# Patient Record
Sex: FEMALE | Race: BLACK OR AFRICAN AMERICAN | NOT HISPANIC OR LATINO | Employment: UNEMPLOYED | URBAN - METROPOLITAN AREA
[De-identification: names, ages, dates, MRNs, and addresses within clinical notes are randomized per-mention and may not be internally consistent; named-entity substitution may affect disease eponyms.]

---

## 2021-01-27 ENCOUNTER — OFFICE VISIT (OUTPATIENT)
Dept: URGENT CARE | Facility: URGENT CARE | Age: 76
End: 2021-01-27
Payer: MEDICAID

## 2021-01-27 ENCOUNTER — ANCILLARY PROCEDURE (OUTPATIENT)
Dept: GENERAL RADIOLOGY | Facility: CLINIC | Age: 76
End: 2021-01-27
Attending: PHYSICIAN ASSISTANT
Payer: MEDICAID

## 2021-01-27 VITALS
OXYGEN SATURATION: 99 % | SYSTOLIC BLOOD PRESSURE: 108 MMHG | TEMPERATURE: 98.1 F | DIASTOLIC BLOOD PRESSURE: 67 MMHG | BODY MASS INDEX: 28.7 KG/M2 | HEART RATE: 85 BPM | RESPIRATION RATE: 20 BRPM | WEIGHT: 152 LBS

## 2021-01-27 DIAGNOSIS — E87.6 LOW BLOOD POTASSIUM: ICD-10-CM

## 2021-01-27 DIAGNOSIS — M25.512 ACUTE PAIN OF LEFT SHOULDER: ICD-10-CM

## 2021-01-27 DIAGNOSIS — I10 HYPERTENSION, UNSPECIFIED TYPE: Primary | ICD-10-CM

## 2021-01-27 LAB
ANION GAP SERPL CALCULATED.3IONS-SCNC: 6 MMOL/L (ref 3–14)
BASOPHILS # BLD AUTO: 0 10E9/L (ref 0–0.2)
BASOPHILS NFR BLD AUTO: 0.2 %
BUN SERPL-MCNC: 11 MG/DL (ref 7–30)
CALCIUM SERPL-MCNC: 9.2 MG/DL (ref 8.5–10.1)
CHLORIDE SERPL-SCNC: 104 MMOL/L (ref 94–109)
CO2 SERPL-SCNC: 28 MMOL/L (ref 20–32)
CREAT SERPL-MCNC: 0.68 MG/DL (ref 0.52–1.04)
DIFFERENTIAL METHOD BLD: ABNORMAL
EOSINOPHIL # BLD AUTO: 0.2 10E9/L (ref 0–0.7)
EOSINOPHIL NFR BLD AUTO: 3.8 %
ERYTHROCYTE [DISTWIDTH] IN BLOOD BY AUTOMATED COUNT: 13.1 % (ref 10–15)
GFR SERPL CREATININE-BSD FRML MDRD: 85 ML/MIN/{1.73_M2}
GLUCOSE SERPL-MCNC: 122 MG/DL (ref 70–99)
HCT VFR BLD AUTO: 34.9 % (ref 35–47)
HGB BLD-MCNC: 11.3 G/DL (ref 11.7–15.7)
LYMPHOCYTES # BLD AUTO: 1.5 10E9/L (ref 0.8–5.3)
LYMPHOCYTES NFR BLD AUTO: 36.4 %
MCH RBC QN AUTO: 30 PG (ref 26.5–33)
MCHC RBC AUTO-ENTMCNC: 32.4 G/DL (ref 31.5–36.5)
MCV RBC AUTO: 93 FL (ref 78–100)
MONOCYTES # BLD AUTO: 0.3 10E9/L (ref 0–1.3)
MONOCYTES NFR BLD AUTO: 7.9 %
NEUTROPHILS # BLD AUTO: 2.2 10E9/L (ref 1.6–8.3)
NEUTROPHILS NFR BLD AUTO: 51.7 %
PLATELET # BLD AUTO: 295 10E9/L (ref 150–450)
POTASSIUM SERPL-SCNC: 2.7 MMOL/L (ref 3.4–5.3)
RBC # BLD AUTO: 3.77 10E12/L (ref 3.8–5.2)
SODIUM SERPL-SCNC: 138 MMOL/L (ref 133–144)
WBC # BLD AUTO: 4.2 10E9/L (ref 4–11)

## 2021-01-27 PROCEDURE — 36415 COLL VENOUS BLD VENIPUNCTURE: CPT | Performed by: PHYSICIAN ASSISTANT

## 2021-01-27 PROCEDURE — 80048 BASIC METABOLIC PNL TOTAL CA: CPT | Performed by: PHYSICIAN ASSISTANT

## 2021-01-27 PROCEDURE — 93000 ELECTROCARDIOGRAM COMPLETE: CPT | Performed by: PHYSICIAN ASSISTANT

## 2021-01-27 PROCEDURE — 99204 OFFICE O/P NEW MOD 45 MIN: CPT | Performed by: PHYSICIAN ASSISTANT

## 2021-01-27 PROCEDURE — 85025 COMPLETE CBC W/AUTO DIFF WBC: CPT | Performed by: PHYSICIAN ASSISTANT

## 2021-01-27 PROCEDURE — 73030 X-RAY EXAM OF SHOULDER: CPT | Mod: LT | Performed by: RADIOLOGY

## 2021-01-27 RX ORDER — NAPROXEN 500 MG/1
500 TABLET ORAL 2 TIMES DAILY WITH MEALS
Qty: 30 TABLET | Refills: 0 | Status: SHIPPED | OUTPATIENT
Start: 2021-01-27 | End: 2021-02-04

## 2021-01-27 RX ORDER — POTASSIUM CHLORIDE 1500 MG/1
20 TABLET, EXTENDED RELEASE ORAL 2 TIMES DAILY
Qty: 30 TABLET | Refills: 0 | Status: SHIPPED | OUTPATIENT
Start: 2021-01-27 | End: 2021-02-04

## 2021-01-27 RX ORDER — AMLODIPINE BESYLATE 5 MG/1
5 TABLET ORAL DAILY
COMMUNITY
Start: 2020-12-30 | End: 2021-02-04

## 2021-01-27 NOTE — PROGRESS NOTES
Patient presents with:  Headache  Musculoskeletal Problem: Left upper back with Left shoulder pain-sx's all for about 4 months      Appointment made for patient to see Dr. Solorio 2/4/21    Greater than 45 minutes was used to review chart and labs, examine patient and discussing and reviewing the results of diagnostic tests, discussing risks and benefits of management (treatment) options, instruction for management and follow up and importance of compliance with treatment, as well as scheduling follow up to establish care.        Patient Instructions     (I10) Hypertension, unspecified type  (primary encounter diagnosis)  Comment:   Plan: Basic metabolic panel  (Ca, Cl, CO2, Creat,         Gluc, K, Na, BUN), CBC with platelets and         differential          Continue Amlodipine    (M25.512) Acute pain of left shoulder  Comment:   Plan: XR Shoulder Left 2 Views, EKG 12-lead complete         w/read - Clinics  Xray normal  Naprosyn as needed for pain  Follow up with primary clinic, you will likely benefit from a referral to Orthopedics and Physical therapy.              (E87.6) Low blood potassium  Comment:   Plan: EKG 12-lead complete w/read - Clinics,         potassium chloride ER (KLOR-CON M) 20 MEQ CR         tablet          See handout on potassium rich diet    Keep follow up appointment with primary clinic      Patient Education     Discharge Instructions: Eating a High-Potassium Diet  Your healthcare provider has told you to eat a high-potassium diet. This may be because you have low levels of potassium in your blood. Or it may be because you have high blood pressure. Potassium is found in many foods. These include dairy products, nuts, seeds, and beans. It s also found in many fruits and vegetables in high amounts.  Guidelines for a high-potassium diet    Eat fruits and vegetables in their fresh or raw form most often.    Check labels for ingredients that have potassium. This includes potassium chloride. Add these  items to your diet.    Try salt substitutes. Many of these have potassium.    Avoid licorice. This includes licorice root and teas that have licorice. These can reduce potassium levels in your body.  Eat plenty of the following high-potassium foods:    Fruits. Good choices are apricots (canned and fresh), bananas, cantaloupe, honeydew melon, kiwi, nectarines, oranges, orange juice, and pears. Dried fruits include apricots, dates, figs, and prunes. Prune juice also has potassium.    Vegetables. Good choices are asparagus, avocado, artichoke, broccoli, bamboo shoots, beets, [place] sprouts, cabbage, celery, chard, okra, potatoes (white and sweet), pumpkin, rutabaga, spinach (cooked), squash, and tomatoes. Tomato sauce, tomato juice, and vegetable juice cocktail are also good choices.    Chicken, fish, clams, and crab    Milk, chocolate milk, buttermilk, and soy milk    Legumes. These include black-eyed peas, chickpeas, lentils, lima beans, navy beans, red kidney beans, soybeans, and split peas.    Nuts and seeds. Try almonds, Brazil nuts, cashews, peanuts, peanut butter, pecans, pumpkin seeds, sunflower seeds, and walnuts.    Breads and cereals. These include bran and whole-grain products.    Others foods include chocolate, cocoa, coconut milk, and molasses  Follow-up  Make a follow-up appointment for a repeat test.  When to call your healthcare provider  Call your healthcare provider right away or go to the ER if you have any of the following:    Vomiting    Extreme tiredness (fatigue)    Diarrhea    Rapid, irregular heartbeat    Shortness of breath    Chest pain    Muscle cramps, spasms, or twitching    Weakness    Paralysis   Jona last reviewed this educational content on 11/1/2017 2000-2020 [employer]. [address] [place] [address], [place] [address]. All rights reserved. This information is not intended as a substitute for professional medical care. Always follow your healthcare professional's  instructions.                       SUBJECTIVE:   Alesha Alatorre is a 76 year old female who presents today with long standing left shoulder pain, at least for the past 4 months.    No falls or trauma.        Her Granddaughter LEVI is here with her today.    She lives with her daughter carmen and granddaughter Lilia. Levi checks on her daily.      Aleve muscle (orange lable) helps some, but it still hurts.    She does not have a primary clinic.        Still taking her blood pressure medication.  She started it in April after ED visit.      Denies any cough or chest pain, or fevers.      No past medical history on file.  Current Outpatient Medications   Medication Sig Dispense Refill     Multiple Vitamins-Iron (DAILY-CHRISSY/IRON/BETA-CAROTENE) TABS TAKE 1 TABLET BY MOUTH DAILY. (Patient not taking: Reported on 10/19/2020) 30 tablet 7     Social History     Tobacco Use     Smoking status: Never Smoker     Smokeless tobacco: Never Used   Substance Use Topics     Alcohol use: Not on file     Family History   Problem Relation Age of Onset     Diabetes Mother      Diabetes Father          ROS:    10 point ROS of systems including Constitutional, Eyes, Respiratory, Cardiovascular, Gastroenterology, Genitourinary, Integumentary, Muscularskeletal, Psychiatric ,neurological were all negative except for pertinent positives noted in my HPI         OBJECTIVE:  /67   Pulse 85   Temp 98.1  F (36.7  C)   Resp 20   Wt 68.9 kg (152 lb)   SpO2 99%   BMI 28.70 kg/m    Physical Exam:  GENERAL APPEARANCE: healthy, alert and no distress  EYES: EOMI,  PERRL, conjunctiva clear  HENT: ear canals and TM's normal.  Nose and mouth without ulcers, erythema or lesions  NECK: supple, nontender, no lymphadenopathy  RESP: lungs clear to auscultation - no rales, rhonchi or wheezes  CV: regular rates and rhythm, normal S1 S2, no murmur noted  ABDOMEN:  soft, nontender, no HSM or masses and bowel sounds normal  Extremities: left trapezius tender  to palpation.  ROM of left shoulder decreased secondary to pain.    Unable to raise left arm over shoulder at side, or directly overhead.    NEURO: Normal strength and tone, sensory exam grossly normal,  normal speech and mentation  SKIN: no suspicious lesions or rashes    X-Ray was done, my findings are: no fractures or bony abnormalities    EKG: no ST-T changes or q waves as per my interpretation, normal rhythm.

## 2021-01-27 NOTE — PATIENT INSTRUCTIONS
(I10) Hypertension, unspecified type  (primary encounter diagnosis)  Comment:   Plan: Basic metabolic panel  (Ca, Cl, CO2, Creat,         Gluc, K, Na, BUN), CBC with platelets and         differential          Continue Amlodipine    (M25.512) Acute pain of left shoulder  Comment:   Plan: XR Shoulder Left 2 Views, EKG 12-lead complete         w/read - Clinics  Xray normal  Naprosyn as needed for pain  Follow up with primary clinic, you will likely benefit from a referral to Orthopedics and Physical therapy.              (E87.6) Low blood potassium  Comment:   Plan: EKG 12-lead complete w/read - Clinics,         potassium chloride ER (KLOR-CON M) 20 MEQ CR         tablet          See handout on potassium rich diet    Keep follow up appointment with primary clinic      Patient Education     Discharge Instructions: Eating a High-Potassium Diet  Your healthcare provider has told you to eat a high-potassium diet. This may be because you have low levels of potassium in your blood. Or it may be because you have high blood pressure. Potassium is found in many foods. These include dairy products, nuts, seeds, and beans. It s also found in many fruits and vegetables in high amounts.  Guidelines for a high-potassium diet    Eat fruits and vegetables in their fresh or raw form most often.    Check labels for ingredients that have potassium. This includes potassium chloride. Add these items to your diet.    Try salt substitutes. Many of these have potassium.    Avoid licorice. This includes licorice root and teas that have licorice. These can reduce potassium levels in your body.  Eat plenty of the following high-potassium foods:    Fruits. Good choices are apricots (canned and fresh), bananas, cantaloupe, honeydew melon, kiwi, nectarines, oranges, orange juice, and pears. Dried fruits include apricots, dates, figs, and prunes. Prune juice also has potassium.    Vegetables. Good choices are asparagus, avocado, artichoke,  broccoli, bamboo shoots, beets, [place] sprouts, cabbage, celery, chard, okra, potatoes (white and sweet), pumpkin, rutabaga, spinach (cooked), squash, and tomatoes. Tomato sauce, tomato juice, and vegetable juice cocktail are also good choices.    Chicken, fish, clams, and crab    Milk, chocolate milk, buttermilk, and soy milk    Legumes. These include black-eyed peas, chickpeas, lentils, lima beans, navy beans, red kidney beans, soybeans, and split peas.    Nuts and seeds. Try almonds, [place] nuts, cashews, peanuts, peanut butter, pecans, pumpkin seeds, sunflower seeds, and walnuts.    Breads and cereals. These include bran and whole-grain products.    Others foods include chocolate, cocoa, coconut milk, and molasses  Follow-up  Make a follow-up appointment for a repeat test.  When to call your healthcare provider  Call your healthcare provider right away or go to the ER if you have any of the following:    Vomiting    Extreme tiredness (fatigue)    Diarrhea    Rapid, irregular heartbeat    Shortness of breath    Chest pain    Muscle cramps, spasms, or twitching    Weakness    Paralysis   Jona last reviewed this educational content on 11/1/2017 2000-2020 [employer]. [address] [place] [id number]. All rights reserved. This information is not intended as a substitute for professional medical care. Always follow your healthcare professional's instructions.

## 2021-02-04 ENCOUNTER — OFFICE VISIT (OUTPATIENT)
Dept: FAMILY MEDICINE | Facility: CLINIC | Age: 76
End: 2021-02-04
Payer: MEDICAID

## 2021-02-04 VITALS
DIASTOLIC BLOOD PRESSURE: 79 MMHG | TEMPERATURE: 98.1 F | HEART RATE: 83 BPM | OXYGEN SATURATION: 97 % | HEIGHT: 61 IN | SYSTOLIC BLOOD PRESSURE: 138 MMHG | BODY MASS INDEX: 28.75 KG/M2 | WEIGHT: 152.25 LBS

## 2021-02-04 DIAGNOSIS — M25.512 LEFT SHOULDER PAIN, UNSPECIFIED CHRONICITY: ICD-10-CM

## 2021-02-04 DIAGNOSIS — E87.6 HYPOKALEMIA: ICD-10-CM

## 2021-02-04 DIAGNOSIS — Z13.6 CARDIOVASCULAR SCREENING; LDL GOAL LESS THAN 130: ICD-10-CM

## 2021-02-04 DIAGNOSIS — Z78.0 ASYMPTOMATIC POSTMENOPAUSAL ESTROGEN DEFICIENCY: ICD-10-CM

## 2021-02-04 DIAGNOSIS — I10 ESSENTIAL HYPERTENSION WITH GOAL BLOOD PRESSURE LESS THAN 140/90: Primary | ICD-10-CM

## 2021-02-04 DIAGNOSIS — Z23 ENCOUNTER FOR IMMUNIZATION: ICD-10-CM

## 2021-02-04 DIAGNOSIS — Z12.11 SCREEN FOR COLON CANCER: ICD-10-CM

## 2021-02-04 LAB
ANION GAP SERPL CALCULATED.3IONS-SCNC: 5 MMOL/L (ref 3–14)
BUN SERPL-MCNC: 17 MG/DL (ref 7–30)
CALCIUM SERPL-MCNC: 9.1 MG/DL (ref 8.5–10.1)
CHLORIDE SERPL-SCNC: 104 MMOL/L (ref 94–109)
CHOLEST SERPL-MCNC: 208 MG/DL
CO2 SERPL-SCNC: 29 MMOL/L (ref 20–32)
CREAT SERPL-MCNC: 0.76 MG/DL (ref 0.52–1.04)
GFR SERPL CREATININE-BSD FRML MDRD: 77 ML/MIN/{1.73_M2}
GLUCOSE SERPL-MCNC: 80 MG/DL (ref 70–99)
HDLC SERPL-MCNC: 56 MG/DL
LDLC SERPL CALC-MCNC: 134 MG/DL
NONHDLC SERPL-MCNC: 152 MG/DL
POTASSIUM SERPL-SCNC: 4.1 MMOL/L (ref 3.4–5.3)
SODIUM SERPL-SCNC: 138 MMOL/L (ref 133–144)
TRIGL SERPL-MCNC: 91 MG/DL

## 2021-02-04 PROCEDURE — 82043 UR ALBUMIN QUANTITATIVE: CPT | Performed by: FAMILY MEDICINE

## 2021-02-04 PROCEDURE — 36415 COLL VENOUS BLD VENIPUNCTURE: CPT | Performed by: FAMILY MEDICINE

## 2021-02-04 PROCEDURE — 99214 OFFICE O/P EST MOD 30 MIN: CPT | Mod: 25 | Performed by: FAMILY MEDICINE

## 2021-02-04 PROCEDURE — 80048 BASIC METABOLIC PNL TOTAL CA: CPT | Performed by: FAMILY MEDICINE

## 2021-02-04 PROCEDURE — 80061 LIPID PANEL: CPT | Performed by: FAMILY MEDICINE

## 2021-02-04 PROCEDURE — 90471 IMMUNIZATION ADMIN: CPT | Performed by: FAMILY MEDICINE

## 2021-02-04 PROCEDURE — 90662 IIV NO PRSV INCREASED AG IM: CPT | Performed by: FAMILY MEDICINE

## 2021-02-04 RX ORDER — POTASSIUM CHLORIDE 1500 MG/1
20 TABLET, EXTENDED RELEASE ORAL DAILY
Qty: 90 TABLET | Refills: 3 | Status: SHIPPED | OUTPATIENT
Start: 2021-02-04 | End: 2022-04-06

## 2021-02-04 RX ORDER — SENNOSIDES 8.6 MG
650 CAPSULE ORAL EVERY 8 HOURS PRN
Qty: 90 TABLET | Refills: 11 | Status: SHIPPED | OUTPATIENT
Start: 2021-02-04

## 2021-02-04 RX ORDER — AMLODIPINE BESYLATE 5 MG/1
5 TABLET ORAL DAILY
Qty: 90 TABLET | Refills: 3 | Status: SHIPPED | OUTPATIENT
Start: 2021-02-04 | End: 2022-04-06

## 2021-02-04 ASSESSMENT — PAIN SCALES - GENERAL: PAINLEVEL: SEVERE PAIN (7)

## 2021-02-04 ASSESSMENT — MIFFLIN-ST. JEOR: SCORE: 1110.04

## 2021-02-04 NOTE — PROGRESS NOTES
"    Surjit Eduardo is a 76 year old who presents to clinic today for the following health issues:    HPI       Hypertension Follow-up      Do you check your blood pressure regularly outside of the clinic? No     Are you following a low salt diet? Yes    Are your blood pressures ever more than 140 on the top number (systolic) OR more   than 90 on the bottom number (diastolic), for example 140/90? n/a    Musculoskeletal problem/pain  Onset/Duration: months  Description  Location: shoulder - left  Joint Swelling: no  Redness: no  Pain: YES  Warmth: no  Intensity:  mild  Progression of Symptoms:  same  Accompanying signs and symptoms:   Fevers: no  Numbness/tingling/weakness: no  History  Trauma to the area: no  Recent illness:  no  Previous similar problem: no  Previous evaluation:  no  Precipitating or alleviating factors:  Aggravating factors include: lifting above shoulders  Therapies tried and outcome: NSAID - naproxen with relief      Review of Systems   Constitutional, HEENT, cardiovascular, pulmonary, GI, , musculoskeletal, neuro, skin, endocrine and psych systems are negative, except as otherwise noted.      Objective    /79 (BP Location: Right arm, Patient Position: Sitting, Cuff Size: Adult Regular)   Pulse 83   Temp 98.1  F (36.7  C) (Oral)   Ht 1.537 m (5' 0.5\")   Wt 69.1 kg (152 lb 4 oz)   LMP  (LMP Unknown)   SpO2 97%   Breastfeeding No   BMI 29.24 kg/m    Body mass index is 29.24 kg/m .  Physical Exam   GENERAL: healthy, alert and no distress  NECK: no adenopathy, no asymmetry, masses, or scars and thyroid normal to palpation  RESP: lungs clear to auscultation - no rales, rhonchi or wheezes  CV: regular rate and rhythm, normal S1 S2, no S3 or S4, no murmur, click or rub, no peripheral edema and peripheral pulses strong  ABDOMEN: soft, nontender, no hepatosplenomegaly, no masses and bowel sounds normal  MS: pain with above the shoulder movements, left    A/P:  (I10) Essential " hypertension with goal blood pressure less than 140/90  (primary encounter diagnosis)  Comment:   Plan: amLODIPine (NORVASC) 5 MG tablet, Albumin         Random Urine Quantitative with Creat Ratio        Controlled. Continue with amlodipine. Recheck in 6 months.     (E87.6) Hypokalemia  Comment:   Plan: potassium chloride ER (KLOR-CON M) 20 MEQ CR         tablet, Basic metabolic panel  (Ca, Cl, CO2,         Creat, Gluc, K, Na, BUN)        Recheck potassium. Discussed potassium diet.    (Z13.6) CARDIOVASCULAR SCREENING; LDL GOAL LESS THAN 130  Comment:   Plan: Lipid panel reflex to direct LDL Non-fasting            (M25.512) Left shoulder pain, unspecified chronicity  Comment:   Plan: acetaminophen (TYLENOL) 650 MG CR tablet, [facility]         PT, HAND, AND CHIROPRACTIC REFERRAL        Likely impingement syndrome. Arthritis. Referred to physical therapy for evaluation and treatment.    (Z78.0) Asymptomatic postmenopausal estrogen deficiency  Comment:   Plan: DEXA HIP/PELVIS/SPINE - Future            (Z12.11) Screen for colon cancer  Comment:   Plan: Fecal colorectal cancer screen (FIT)            (Z23) Encounter for immunization  Comment:   Plan: [place] FLU VACCINE, INCREASED ANTIGEN, PRESV FREE,         ADMIN 1st VACCINE              Romie Solorio MD

## 2021-02-04 NOTE — LETTER
February 9, 2021      Alesha Alatorre  [address]  [place] [address]            Dear ,    We are writing to inform you of your test results.    Your kidney, electrolyte, blood sugar and cholesterol tests were normal. Please follow up in 6 months for recheck.    Resulted Orders   Lipid panel reflex to direct LDL Non-fasting   Result Value Ref Range    Cholesterol 208 (H) <200 mg/dL      Comment:      Desirable:       <200 mg/dl    Triglycerides 91 <150 mg/dL      Comment:      Non Fasting    HDL Cholesterol 56 >49 mg/dL    LDL Cholesterol Calculated 134 (H) <100 mg/dL      Comment:      Above desirable:  100-129 mg/dl  Borderline High:  130-159 mg/dL  High:             160-189 mg/dL  Very high:       >189 mg/dl      Non HDL Cholesterol 152 (H) <130 mg/dL      Comment:      Above Desirable:  130-159 mg/dl  Borderline high:  160-189 mg/dl  High:             190-219 mg/dl  Very high:       >219 mg/dl     Basic metabolic panel  (Ca, Cl, CO2, Creat, Gluc, K, Na, BUN)   Result Value Ref Range    Sodium 138 133 - 144 mmol/L    Potassium 4.1 3.4 - 5.3 mmol/L    Chloride 104 94 - 109 mmol/L    Carbon Dioxide 29 20 - 32 mmol/L    Anion Gap 5 3 - 14 mmol/L    Glucose 80 70 - 99 mg/dL      Comment:      Non Fasting    Urea Nitrogen 17 7 - 30 mg/dL    Creatinine 0.76 0.52 - 1.04 mg/dL    GFR Estimate 77 >60 mL/min/[1.73_m2]      Comment:      Non  GFR Calc  Starting 12/18/2018, serum creatinine based estimated GFR (eGFR) will be   calculated using the Chronic Kidney Disease Epidemiology Collaboration   (CKD-EPI) equation.      GFR Estimate If Black 89 >60 mL/min/[1.73_m2]      Comment:       GFR Calc  Starting 12/18/2018, serum creatinine based estimated GFR (eGFR) will be   calculated using the Chronic Kidney Disease Epidemiology Collaboration   (CKD-EPI) equation.      Calcium 9.1 8.5 - 10.1 mg/dL   Albumin Random Urine Quantitative with Creat Ratio   Result Value Ref Range     Creatinine Urine 37 mg/dL    Albumin Urine mg/L <5 mg/L    Albumin Urine mg/g Cr Unable to calculate due to low value 0 - 25 mg/g Cr       If you have any questions or concerns, please call the clinic at the number listed above.       Sincerely,    Romie Solorio MD/ml

## 2021-02-05 LAB
CREAT UR-MCNC: 37 MG/DL
MICROALBUMIN UR-MCNC: <5 MG/L
MICROALBUMIN/CREAT UR: NORMAL MG/G CR (ref 0–25)

## 2021-02-06 DIAGNOSIS — Z12.11 SCREEN FOR COLON CANCER: ICD-10-CM

## 2021-02-06 PROCEDURE — 82274 ASSAY TEST FOR BLOOD FECAL: CPT | Performed by: FAMILY MEDICINE

## 2021-02-10 LAB — HEMOCCULT STL QL IA: POSITIVE

## 2021-02-12 ENCOUNTER — TELEPHONE (OUTPATIENT)
Dept: FAMILY MEDICINE | Facility: CLINIC | Age: 76
End: 2021-02-12

## 2021-02-12 NOTE — TELEPHONE ENCOUNTER
Contacted  services. They do not have a [facility] . They asked me to call patient to verify which language she speaks, as there are about 20 different languages in [facility].     There is no consent to communicate on file with any family members. The emergency contact is Mckenzie, I attempted to call Hope to ask her what language but her mail box is full and I could not leave a message.     Will try again later to contact Mckenzie. (Called Mckenzie at 9:23 am and again 1:44pm)     Hannah Gregg RN  [facility]       Romie Solorio MD   2/12/2021  8:19 AM CST      Please notify patient that her colon cancer screening stool test was positive for blood. Patient should proceed with a colonoscopy for further colon cancer screening. Patient referred to [facility] [phone].     Romei Solorio MD

## 2021-02-12 NOTE — LETTER
February 15, 2021      Alesha Alatorre  [address]  [place] [address]              Dear Alesha,    colon cancer screening stool test was positive for blood. You should proceed with a colonoscopy for further colon cancer screening. You have been referred to [facility] [phone]. Please call their office and set up an appointment      Sincerely,      Romie Solorio MD

## 2021-02-15 NOTE — TELEPHONE ENCOUNTER
Attempted to get a hold of Hope, granddaughter, but mail box is full.     Called next contact, Daja and confirmed patient speaks [place], no other language.     Patient called and Daja and patient placed on speaker phone. Alesha gives me permission to speak to Daja regarding her health information and message from Dr. Solorio. Iinformed of the below per provider documentation. Patient and Daja verbalizes understanding. They took down the number to call and get scheduled.     Team, please mail these results to patient and also send a consent to communicate form to patient. Write to patient that they should return this form to the clinic. Thank you!     Hannah Gregg RN  [facility]

## 2021-02-15 NOTE — TELEPHONE ENCOUNTER
Letter created and mailed with lab result. Also mailed consent to communicate form to patient's home address.  Jim Monahan,  For [facility]st [facility]

## 2021-02-25 ENCOUNTER — THERAPY VISIT (OUTPATIENT)
Dept: PHYSICAL THERAPY | Facility: CLINIC | Age: 76
End: 2021-02-25
Attending: FAMILY MEDICINE
Payer: MEDICAID

## 2021-02-25 DIAGNOSIS — M25.512 LEFT SHOULDER PAIN: ICD-10-CM

## 2021-02-25 DIAGNOSIS — M25.512 LEFT SHOULDER PAIN, UNSPECIFIED CHRONICITY: ICD-10-CM

## 2021-02-25 PROCEDURE — 97110 THERAPEUTIC EXERCISES: CPT | Mod: GP | Performed by: PHYSICAL THERAPIST

## 2021-02-25 PROCEDURE — 97161 PT EVAL LOW COMPLEX 20 MIN: CPT | Mod: GP | Performed by: PHYSICAL THERAPIST

## 2021-02-25 NOTE — PROGRESS NOTES
[facility] Initial Evaluation  Subjective:  The history is provided by the patient and a relative. The history is limited by a language barrier. A  was used (daughter acting as ).   Patient Health History  Alesha Alatorre being seen for L shoulder pain.     Problem began: 2/2/2021.   Problem occurred: insidsious onset   Pain is reported as 7/10 on pain scale.  General health as reported by patient is fair.  Pertinent medical history includes: none.   Red flags:  None as reported by patient.  Medical allergies: none.   Surgeries include:  None.    Current medications:  None.    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                     Therapist Generated HPI Evaluation  Problem details: Patient reports that she noted insidious onset of left shoulder pain about 1 month ago. She denies any prior problems. MD order from 2-2-21..         Type of problem:  Left shoulder (right handed).    This is a new condition.  Condition occurred with:  Unknown cause.  Where condition occurred: for unknown reasons.  Patient reports pain:  Anterior, posterior, lateral and upper arm.  Pain is described as aching and is constant.  Pain is the same all the time (pain based more of activity or position).  Since onset symptoms are gradually improving.  Associated symptoms:  Loss of motion/stiffness and loss of strength. Symptoms are exacerbated by using arm behind back, using arm at shoulder level, lying on extremity, using arm overhead, other and lifting (8/10 pain to put dress on over her head)  and relieved by rest.      Barriers include:  None as reported by patient.                        Objective:  System                   Shoulder Evaluation:  ROM:  AROM:    Flexion:  Left:  95 with pain    Right:  152    Abduction:  Left: 90 with pain   Right:  150                  Extension/Internal Rotation:  Left:  L buttock with pain    Right:  T 8 SP          Strength:  :  strength not assessed-pt has lot of difficulty understanding what to do.                      Stability Testing:  not assessed      Special Tests:  not assessed      Palpation:  not assessed                                         General     ROS    Assessment/Plan:    Patient is a 76 year old female with left side shoulder complaints.    Patient has the following significant findings with corresponding treatment plan.                Diagnosis 1:  L shoulder pain  Pain -  US, manual therapy, self management, education, directional preference exercise and home program  Decreased ROM/flexibility - manual therapy, therapeutic exercise and home program  Decreased strength - therapeutic exercise, therapeutic activities and home program  Impaired muscle performance - neuro re-education and home program  Decreased function - therapeutic activities and home program    Therapy Evaluation Codes:   1) History comprised of:   Personal factors that impact the plan of care:      Social history/culture.    Comorbidity factors that impact the plan of care are:      None.     Medications impacting care: None.  2) Examination of Body Systems comprised of:   Body structures and functions that impact the plan of care:      Shoulder.   Activity limitations that impact the plan of care are:      Bathing, Cooking, Dressing, Lifting and reaching.  3) Clinical presentation characteristics are:   Stable/Uncomplicated.  4) Decision-Making    Low complexity using standardized patient assessment instrument and/or measureable assessment of functional outcome.  Cumulative Therapy Evaluation is: Low complexity.    Previous and current functional limitations:  (See Goal Flow Sheet for this information)    Short term and Long term goals: (See Goal Flow Sheet for this information)     Communication ability:  Patient has an  for communication clarity.  Patient is unable to clearly communicate and follow directions.  They will require  assistance to perform a home exercise program.  Treatment Explanation - The following has been discussed with the patient:   RX ordered/plan of care  Anticipated outcomes  Possible risks and side effects  This patient would benefit from PT intervention to resume normal activities.   Rehab potential is good.    Frequency:  1 X week, once daily  Duration:  for 10 weeks  Discharge Plan:  Achieve all LTG.  Independent in home treatment program.  Reach maximal therapeutic benefit.    Please refer to the daily flowsheet for treatment today, total treatment time and time spent performing 1:1 timed codes.

## 2021-02-25 NOTE — LETTER
DEPARTMENT OF HEALTH AND HUMAN SERVICES  CENTERS FOR MEDICARE & MEDICAID SERVICES    PLAN/UPDATED PLAN OF PROGRESS FOR OUTPATIENT REHABILITATION     PATIENTS NAME:  Alesha Alatorre   : 1945  PROVIDER NUMBER:    [id number]  [id number]   PROVIDER NAME: [facility]  MEDICAL RECORD NUMBER: [id number]   START OF CARE DATE:  SOC Date: 21   TYPE:  PT  PRIMARY/TREATMENT DIAGNOSIS: (Pertinent Medical Diagnosis)  Left shoulder pain, unspecified chronicity  Left shoulder pain  VISITS FROM START OF CARE:  Rxs Used: 1     [place] [facility] Initial Evaluation  Subjective:  The history is provided by the patient and a relative. The history is limited by a language barrier. A  was used (daughter acting as ).   Patient Health History  Alesha Alatorre being seen for L shoulder pain.   Problem began: 2021.   Problem occurred: insidsious onset   Pain is reported as 7/10 on pain scale.  General health as reported by patient is fair.  Pertinent medical history includes: none.   Red flags:  None as reported by patient.  Medical allergies: none.   Surgeries include:  None.    Current medications:  None.    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      Therapist Generated HPI Evaluation  Problem details: Patient reports that she noted insidious onset of left shoulder pain about 1 month ago. She denies any prior problems. MD order from 21..         Type of problem:  Left shoulder (right handed).    This is a new condition.  Condition occurred with:  Unknown cause.  Where condition occurred: for unknown reasons.  Patient reports pain:  Anterior, posterior, lateral and upper arm.  Pain is described as aching and is constant.  Pain is the same all the time (pain based more of activity or position).  Since onset symptoms are gradually improving.  Associated symptoms:  Loss of motion/stiffness and loss of strength. Symptoms are  exacerbated by using arm behind back, using arm at shoulder level, lying on extremity, using arm overhead, other and lifting (8/10 pain to put dress on over her head)  and relieved by rest.  Barriers include:  None as reported by patient.  Objective:  System      Shoulder Evaluation:  ROM:  AROM:    Flexion:  Left:  95 with pain    Right:  152  Abduction:  Left: 90 with pain   Right:  150  Extension/Internal Rotation:  Left:  L buttock with pain    Right:  T 8 SP      Strength:  : strength not assessed-pt has lot of difficulty understanding what to do.  Stability Testing:  not assessed  Special Tests:  not assessed  Palpation:  not assessed    Assessment/Plan:    Patient is a 76 year old female with left side shoulder complaints.    Patient has the following significant findings with corresponding treatment plan.                Diagnosis 1:  L shoulder pain  Pain -  US, manual therapy, self management, education, directional preference exercise and home program  Decreased ROM/flexibility - manual therapy, therapeutic exercise and home program  Decreased strength - therapeutic exercise, therapeutic activities and home program  Impaired muscle performance - neuro re-education and home program  Decreased function - therapeutic activities and home program    Therapy Evaluation Codes:   1) History comprised of:   Personal factors that impact the plan of care:      Social history/culture.    Comorbidity factors that impact the plan of care are:      None.     Medications impacting care: None.  2) Examination of Body Systems comprised of:   Body structures and functions that impact the plan of care:      Shoulder.   Activity limitations that impact the plan of care are:      Bathing, Cooking, Dressing, Lifting and reaching.  3) Clinical presentation characteristics are:   Stable/Uncomplicated.  4) Decision-Making    Low complexity using standardized patient assessment instrument and/or measureable assessment of functional  "outcome.  Cumulative Therapy Evaluation is: Low complexity.    Previous and current functional limitations:  (See Goal Flow Sheet for this information)    Short term and Long term goals: (See Goal Flow Sheet for this information)     Communication ability:  Patient has an  for communication clarity.  Patient is unable to clearly communicate and follow directions.  They will require assistance to perform a home exercise program.  Treatment Explanation - The following has been discussed with the patient:   RX ordered/plan of care  Anticipated outcomes  Possible risks and side effects  This patient would benefit from PT intervention to resume normal activities.   Rehab potential is good.    Frequency:  1 X week, once daily  Duration:  for 10 weeks  Discharge Plan:  Achieve all LTG.  Independent in home treatment program.  Reach maximal therapeutic benefit.    Please refer to the daily flowsheet for treatment today, total treatment time and time spent performing 1:1 timed codes.     Caregiver Signature/Credentials _______________________________________________ Date ________        Dulce Simon PT   I have reviewed and certified the need for these services and plan of treatment while under my care.        PHYSICIAN'S SIGNATURE:   ______________________________________________  Date___________     Romie Solorio MD  Certification period:  Beginning of Cert date period: 02/25/21 to  End of Cert period date: 05/17/21   Functional Level Progress Report: Please see attached \"Goal Flow sheet for Functional level.\"  ____X____ Continue Services or       ________ DC Services              Service dates: From  SOC Date: 02/25/21 date to present                         "

## 2021-02-26 PROBLEM — M25.512 LEFT SHOULDER PAIN: Status: ACTIVE | Noted: 2021-02-26

## 2021-03-25 ENCOUNTER — THERAPY VISIT (OUTPATIENT)
Dept: PHYSICAL THERAPY | Facility: CLINIC | Age: 76
End: 2021-03-25
Payer: MEDICAID

## 2021-03-25 DIAGNOSIS — M25.512 LEFT SHOULDER PAIN: ICD-10-CM

## 2021-03-25 PROCEDURE — 97112 NEUROMUSCULAR REEDUCATION: CPT | Mod: GP | Performed by: PHYSICAL THERAPIST

## 2021-03-25 PROCEDURE — 97110 THERAPEUTIC EXERCISES: CPT | Mod: GP | Performed by: PHYSICAL THERAPIST

## 2021-04-06 ENCOUNTER — THERAPY VISIT (OUTPATIENT)
Dept: PHYSICAL THERAPY | Facility: CLINIC | Age: 76
End: 2021-04-06
Payer: MEDICAID

## 2021-04-06 DIAGNOSIS — M25.512 LEFT SHOULDER PAIN: ICD-10-CM

## 2021-04-06 PROCEDURE — 97112 NEUROMUSCULAR REEDUCATION: CPT | Mod: GP | Performed by: PHYSICAL THERAPIST

## 2021-04-06 PROCEDURE — 97110 THERAPEUTIC EXERCISES: CPT | Mod: GP | Performed by: PHYSICAL THERAPIST

## 2021-04-06 NOTE — LETTER
[facility]  [address]  [facility] [address]  [phone]    2021    Re: Alesha Alatorre   :   1945  MRN:  [id number]   REFERRING PHYSICIAN:   Romie CAMPBELL [facility]    Date of Initial Evaluation:  2021  Visits:  Rxs Used: 3  Reason for Referral:  Left shoulder pain      DISCHARGE REPORT  Progress reporting period is from 21 to 21.       SUBJECTIVE  Subjective changes noted by patient: Patient and daughter report that she is doing well overall. Her pain is intermittent at times with reaching certain ways. She feels significant improvement since starting therapy.    Current Pain level: 2/10.     Previous pain level was 7/10.   Changes in function: Yes (See Goal flowsheet attached for changes in current functional level)  Adverse reaction to treatment or activity: None    OBJECTIVE  Changes noted in objective findings: Standing L shoulder AROM: 140, abduction 138, and IR/EXT to T7 SP which is equal to opposite shoulder.         ASSESSMENT/PLAN  Updated problem list and treatment plan:   Diagnosis 1: L shoulder pain    Pain - manual therapy, self management, education, directional preference exercise and home program  Decreased ROM/flexibility - manual therapy, therapeutic exercise and home program  Decreased strength - therapeutic exercise, therapeutic activities and home program  Decreased proprioception - neuro re-education, therapeutic activities and home program  Impaired muscle performance - neuro re-education and home program  Decreased function - therapeutic activities and home program  STG/LTGs have been met or progress has been made towards goals: Yes (See Goal flow sheet completed today.)  Assessment of Progress: The patient's condition is improving.  The patient's condition has potential to improve.  The patient has met all of their long term goals.  Self Management Plans:  Patient has been instructed in a home treatment program.  Patient has been instructed in self management of symptoms.  Alesha continues to require the following intervention to meet STG and LTG's: PT intervention is no longer required to meet STG/LTG.  Re: Alesha Alatorre   :   1945    Recommendations:  This patient is ready to be discharged from therapy and continue their home treatment program.        Thank you for your referral.    INQUIRIES  Therapist: Dulce Simon PT   [facility]  [address]  [facility] [address]  Phone: [phone]  Fax: [phone]

## 2021-04-06 NOTE — PROGRESS NOTES
Subjective:  HPI  Physical Exam                    Objective:  System    Physical Exam    General     ROS    Assessment/Plan:    DISCHARGE REPORT    Progress reporting period is from 2-25-21 to 4-6-21.       SUBJECTIVE  Subjective changes noted by patient: Patient and daughter report that she is doing well overall. Her pain is intermittent at times with reaching certain ways.  She feels significant improvement since starting therapy.    Current Pain level: 2/10.     Previous pain level was  7/10  .   Changes in function:  Yes (See Goal flowsheet attached for changes in current functional level)  Adverse reaction to treatment or activity: None    OBJECTIVE  Changes noted in objective findings:  Standing L shoulder AROM: 140, abduction 138, and IR/EXT to T7 SP which is equal to opposite shoulder.         ASSESSMENT/PLAN  Updated problem list and treatment plan: Diagnosis 1:  L shoulder pain  Pain -  manual therapy, self management, education, directional preference exercise and home program  Decreased ROM/flexibility - manual therapy, therapeutic exercise and home program  Decreased strength - therapeutic exercise, therapeutic activities and home program  Decreased proprioception - neuro re-education, therapeutic activities and home program  Impaired muscle performance - neuro re-education and home program  Decreased function - therapeutic activities and home program  STG/LTGs have been met or progress has been made towards goals:  Yes (See Goal flow sheet completed today.)  Assessment of Progress: The patient's condition is improving.  The patient's condition has potential to improve.  The patient has met all of their long term goals.  Self Management Plans:  Patient has been instructed in a home treatment program.  Patient  has been instructed in self management of symptoms.    Alesha continues to require the following intervention to meet STG and LTG's:  PT intervention is no longer required to meet  STG/LTG.    Recommendations:  This patient is ready to be discharged from therapy and continue their home treatment program.    Please refer to the daily flowsheet for treatment today, total treatment time and time spent performing 1:1 timed codes.

## 2021-04-07 PROBLEM — M25.512 LEFT SHOULDER PAIN: Status: RESOLVED | Noted: 2021-02-26 | Resolved: 2021-04-07

## 2021-04-26 ENCOUNTER — IMMUNIZATION (OUTPATIENT)
Dept: PEDIATRICS | Facility: CLINIC | Age: 76
End: 2021-04-26
Payer: MEDICAID

## 2021-04-26 PROCEDURE — 91300 PR COVID VAC PFIZER DIL RECON 30 MCG/0.3 ML IM: CPT

## 2021-04-26 PROCEDURE — 0001A PR COVID VAC PFIZER DIL RECON 30 MCG/0.3 ML IM: CPT

## 2021-05-17 ENCOUNTER — IMMUNIZATION (OUTPATIENT)
Dept: PEDIATRICS | Facility: CLINIC | Age: 76
End: 2021-05-17
Attending: INTERNAL MEDICINE
Payer: MEDICAID

## 2021-05-17 PROCEDURE — 0002A PR COVID VAC PFIZER DIL RECON 30 MCG/0.3 ML IM: CPT

## 2021-05-17 PROCEDURE — 91300 PR COVID VAC PFIZER DIL RECON 30 MCG/0.3 ML IM: CPT

## 2022-01-03 ENCOUNTER — IMMUNIZATION (OUTPATIENT)
Dept: NURSING | Facility: CLINIC | Age: 77
End: 2022-01-03
Payer: MEDICAID

## 2022-01-03 PROCEDURE — 0004A PR COVID VAC PFIZER DIL RECON 30 MCG/0.3 ML IM: CPT

## 2022-01-03 PROCEDURE — 91300 PR COVID VAC PFIZER DIL RECON 30 MCG/0.3 ML IM: CPT

## 2022-04-06 ENCOUNTER — OFFICE VISIT (OUTPATIENT)
Dept: FAMILY MEDICINE | Facility: CLINIC | Age: 77
End: 2022-04-06
Payer: MEDICAID

## 2022-04-06 VITALS
HEIGHT: 62 IN | OXYGEN SATURATION: 99 % | RESPIRATION RATE: 18 BRPM | DIASTOLIC BLOOD PRESSURE: 77 MMHG | SYSTOLIC BLOOD PRESSURE: 139 MMHG | TEMPERATURE: 98.7 F | BODY MASS INDEX: 26.61 KG/M2 | HEART RATE: 76 BPM | WEIGHT: 144.6 LBS

## 2022-04-06 DIAGNOSIS — E87.6 HYPOKALEMIA: ICD-10-CM

## 2022-04-06 DIAGNOSIS — Z13.1 SCREENING FOR DIABETES MELLITUS: ICD-10-CM

## 2022-04-06 DIAGNOSIS — Z00.00 ENCOUNTER FOR MEDICARE ANNUAL WELLNESS EXAM: Primary | ICD-10-CM

## 2022-04-06 DIAGNOSIS — Z78.0 POSTMENOPAUSAL ESTROGEN DEFICIENCY: ICD-10-CM

## 2022-04-06 DIAGNOSIS — I10 ESSENTIAL HYPERTENSION WITH GOAL BLOOD PRESSURE LESS THAN 140/90: ICD-10-CM

## 2022-04-06 DIAGNOSIS — Z13.6 CARDIOVASCULAR SCREENING; LDL GOAL LESS THAN 130: ICD-10-CM

## 2022-04-06 LAB
ALBUMIN SERPL-MCNC: 3.5 G/DL (ref 3.4–5)
ALP SERPL-CCNC: 63 U/L (ref 40–150)
ALT SERPL W P-5'-P-CCNC: 20 U/L (ref 0–50)
ANION GAP SERPL CALCULATED.3IONS-SCNC: 6 MMOL/L (ref 3–14)
AST SERPL W P-5'-P-CCNC: 26 U/L (ref 0–45)
BILIRUB SERPL-MCNC: 0.5 MG/DL (ref 0.2–1.3)
BUN SERPL-MCNC: 11 MG/DL (ref 7–30)
CALCIUM SERPL-MCNC: 8.7 MG/DL (ref 8.5–10.1)
CHLORIDE BLD-SCNC: 104 MMOL/L (ref 94–109)
CHOLEST SERPL-MCNC: 190 MG/DL
CO2 SERPL-SCNC: 29 MMOL/L (ref 20–32)
CREAT SERPL-MCNC: 0.65 MG/DL (ref 0.52–1.04)
FASTING STATUS PATIENT QL REPORTED: NO
GFR SERPL CREATININE-BSD FRML MDRD: 90 ML/MIN/1.73M2
GLUCOSE BLD-MCNC: 80 MG/DL (ref 70–99)
HDLC SERPL-MCNC: 61 MG/DL
LDLC SERPL CALC-MCNC: 112 MG/DL
NONHDLC SERPL-MCNC: 129 MG/DL
POTASSIUM BLD-SCNC: 3.5 MMOL/L (ref 3.4–5.3)
PROT SERPL-MCNC: 7.6 G/DL (ref 6.8–8.8)
SODIUM SERPL-SCNC: 139 MMOL/L (ref 133–144)
TRIGL SERPL-MCNC: 87 MG/DL

## 2022-04-06 PROCEDURE — 80061 LIPID PANEL: CPT | Performed by: FAMILY MEDICINE

## 2022-04-06 PROCEDURE — 82043 UR ALBUMIN QUANTITATIVE: CPT | Performed by: FAMILY MEDICINE

## 2022-04-06 PROCEDURE — 99397 PER PM REEVAL EST PAT 65+ YR: CPT | Performed by: FAMILY MEDICINE

## 2022-04-06 PROCEDURE — 80053 COMPREHEN METABOLIC PANEL: CPT | Performed by: FAMILY MEDICINE

## 2022-04-06 PROCEDURE — 36415 COLL VENOUS BLD VENIPUNCTURE: CPT | Performed by: FAMILY MEDICINE

## 2022-04-06 ASSESSMENT — ENCOUNTER SYMPTOMS
EYE PAIN: 0
JOINT SWELLING: 0
ARTHRALGIAS: 0
PARESTHESIAS: 0
NAUSEA: 0
WEAKNESS: 0
PALPITATIONS: 0
FEVER: 0
CONSTIPATION: 0
HEARTBURN: 0
MYALGIAS: 0
ABDOMINAL PAIN: 0
SORE THROAT: 0
HEMATOCHEZIA: 0
SHORTNESS OF BREATH: 0
HEMATURIA: 0
BREAST MASS: 0
DYSURIA: 0
NERVOUS/ANXIOUS: 0
DIZZINESS: 1
DIARRHEA: 0
COUGH: 0
HEADACHES: 0
CHILLS: 0
FREQUENCY: 0

## 2022-04-06 ASSESSMENT — ACTIVITIES OF DAILY LIVING (ADL): CURRENT_FUNCTION: TELEPHONE REQUIRES ASSISTANCE

## 2022-04-06 ASSESSMENT — PAIN SCALES - GENERAL: PAINLEVEL: NO PAIN (0)

## 2022-04-06 NOTE — LETTER
April 7, 2022      Alesha Alatorre  [address]  [place] [address]        Dear Alesha,     Your kidney, electrolyte, blood sugar and cholesterol tests were normal for you. Please follow up in 1 year for routine Medicare wellness physical for recheck.     Sincerely,   Romie Solorio MD      Resulted Orders   Comprehensive metabolic panel (BMP + Alb, Alk Phos, ALT, AST, Total. Bili, TP)   Result Value Ref Range    Sodium 139 133 - 144 mmol/L    Potassium 3.5 3.4 - 5.3 mmol/L    Chloride 104 94 - 109 mmol/L    Carbon Dioxide (CO2) 29 20 - 32 mmol/L    Anion Gap 6 3 - 14 mmol/L    Urea Nitrogen 11 7 - 30 mg/dL    Creatinine 0.65 0.52 - 1.04 mg/dL    Calcium 8.7 8.5 - 10.1 mg/dL    Glucose 80 70 - 99 mg/dL    Alkaline Phosphatase 63 40 - 150 U/L    AST 26 0 - 45 U/L    ALT 20 0 - 50 U/L    Protein Total 7.6 6.8 - 8.8 g/dL    Albumin 3.5 3.4 - 5.0 g/dL    Bilirubin Total 0.5 0.2 - 1.3 mg/dL    GFR Estimate 90 >60 mL/min/1.73m2      Comment:      Effective December 21, 2021 eGFRcr in adults is calculated using the 2021 CKD-EPI creatinine equation which includes age and gender (Steve et al., NEJ, DOI: 10.1056/SQBPeh3624019)   Lipid panel reflex to direct LDL Non-fasting   Result Value Ref Range    Cholesterol 190 <200 mg/dL    Triglycerides 87 <150 mg/dL    Direct Measure HDL 61 >=50 mg/dL    LDL Cholesterol Calculated 112 (H) <=100 mg/dL    Non HDL Cholesterol 129 <130 mg/dL    Patient Fasting > 8hrs? No     Narrative    Cholesterol  Desirable:  <200 mg/dL    Triglycerides  Normal:  Less than 150 mg/dL  Borderline High:  150-199 mg/dL  High:  200-499 mg/dL  Very High:  Greater than or equal to 500 mg/dL    Direct Measure HDL  Female:  Greater than or equal to 50 mg/dL   Male:  Greater than or equal to 40 mg/dL    LDL Cholesterol  Desirable:  <100mg/dL  Above Desirable:  100-129 mg/dL   Borderline High:  130-159 mg/dL   High:  160-189 mg/dL   Very High:  >= 190 mg/dL    Non HDL Cholesterol  Desirable:  130 mg/dL  Above  Desirable:  130-159 mg/dL  Borderline High:  160-189 mg/dL  High:  190-219 mg/dL  Very High:  Greater than or equal to 220 mg/dL   Albumin Random Urine Quantitative with Creat Ratio   Result Value Ref Range    Creatinine Urine mg/dL 68 mg/dL    Albumin Urine mg/L 26 mg/L    Albumin Urine mg/g Cr 38.24 (H) 0.00 - 25.00 mg/g Cr

## 2022-04-06 NOTE — PATIENT INSTRUCTIONS
At [facility], we strive to deliver an exceptional experience to you, every time we see you. If you receive a survey, please complete it as we do value your feedback.  If you have MyChart, you can expect to receive results automatically within 24 hours of their completion.  Your provider will send a note interpreting your results as well.   If you do not have MyChart, you should receive your results in about a week by mail.    Your care team:                            Family Medicine Internal Medicine   MD Carrillo Martinez MD Shantel Branch-Fleming, MD Srinivasa Vaka, MD Katya Belousova, JESSE EliasHillTRACI Chang CNP, MD Pediatrics   Magen Lopez, MD Ely Haas MD Amelia Massimini APRN CNP   Latrice Mckenzie APRN MIRIAM Phillips MD             Clinic hours: Monday - Thursday 7 am-6 pm; Fridays 7 am-5 pm.   Urgent care: Monday - Friday 10 am- 8 pm; Saturday and Sunday 9 am-5 pm.    Clinic: [phone]       [facility]: Monday - Thursday 8 am - 7 pm; Friday 8 am - 6 pm  [facility]: [phone]       Patient Education   Personalized Prevention Plan  You are due for the preventive services outlined below.  Your care team is available to assist you in scheduling these services.  If you have already completed any of these items, please share that information with your care team to update in your medical record.  Health Maintenance Due   Topic Date Due     Osteoporosis Screening  Never done     ANNUAL REVIEW OF HM ORDERS  Never done     Discuss Advance Care Planning  Never done     Zoster (Shingles) Vaccine (1 of 2) Never done     Annual Wellness Visit  Never done     Flu Vaccine (1) 09/01/2021     PHQ-2 (once per calendar year)  01/01/2022     FALL RISK ASSESSMENT  02/04/2022

## 2022-04-06 NOTE — PROGRESS NOTES
"Subjective:       Patient ID: King Jarrell is a 47 y.o. male.    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 No history of STI, last HIV five months ago was negative      Review of Systems   Constitutional: Negative for activity change, appetite change, fatigue, fever and unexpected weight change.   HENT: Negative for ear pain, hearing loss, rhinorrhea, sore throat and trouble swallowing.    Eyes: Negative for discharge and visual disturbance.   Respiratory: Negative for chest tightness, shortness of breath and wheezing.    Cardiovascular: Negative for chest pain, palpitations and leg swelling.   Gastrointestinal: Negative for abdominal pain, blood in stool, constipation, diarrhea and vomiting.   Endocrine: Negative for cold intolerance, heat intolerance, polydipsia and polyuria.   Genitourinary: Negative for difficulty urinating, dysuria, hematuria and urgency.   Musculoskeletal: Negative for arthralgias, joint swelling, neck pain and neck stiffness.   Skin: Negative for rash.   Neurological: Negative for dizziness, syncope, weakness and headaches.   Psychiatric/Behavioral: Negative for confusion, dysphoric mood and suicidal ideas.       Objective:     Vitals:    09/25/18 1418   BP: (!) 148/96   BP Location: Right arm   Patient Position: Sitting   BP Method: Medium (Manual)   Pulse: 67   Resp: 16   Temp: 98.2 °F (36.8 °C)   TempSrc: Oral " SUBJECTIVE:   Alesha Alatorre is a 77 year old female who presents for Preventive Visit.      Patient has been advised of split billing requirements and indicates understanding: Yes  Are you in the first 12 months of your Medicare coverage?  No    HPI  Do you feel safe in your environment? Yes    Have you ever done Advance Care Planning? (For example, a Health Directive, POLST, or a discussion with a medical provider or your loved ones about your wishes): No, advance care planning information given to patient to review.  Patient declined advance care planning discussion at this time.    Fall risk  Fallen 2 or more times in the past year?: (P) No  Any fall with injury in the past year?: (P) No    Cognitive Screening *UNABLE TO COMPLETE*       Mini-CogTM Copyright NISA Sanchez. Licensed by the author for use in [facility]; reprinted with permission (jc@[facility]). All rights reserved.        Reviewed and updated as needed this visit by clinical staff   Tobacco  Allergies  Meds   Med Hx  Surg Hx  Fam Hx  Soc Hx        Reviewed and updated as needed this visit by Provider                 Social History     Tobacco Use     Smoking status: Never Smoker     Smokeless tobacco: Never Used   Substance Use Topics     Alcohol use: No     If you drink alcohol do you typically have >3 drinks per day or >7 drinks per week? No    Alcohol Use 4/6/2022   Prescreen: >3 drinks/day or >7 drinks/week? Not Applicable   No flowsheet data found.        Current providers sharing in care for this patient include:   Patient Care Team:  No Ref-Primary, Physician as PCP - General  Romie Solorio MD as Assigned PCP    The following health maintenance items are reviewed in Epic and correct as of today:  Health Maintenance Due   Topic Date Due     DEXA  Never done     ANNUAL REVIEW OF HM ORDERS  Never done     ADVANCE CARE PLANNING  Never done     ZOSTER IMMUNIZATION (1 of 2) Never done     MEDICARE ANNUAL WELLNESS VISIT  Never done      "  SpO2: 98%   Weight: 77.5 kg (170 lb 13.7 oz)   Height: 5' 9" (1.753 m)          Physical Exam   Constitutional: He is oriented to person, place, and time. He appears well-developed and well-nourished.   HENT:   Head: Normocephalic and atraumatic.   Eyes: Conjunctivae are normal. No scleral icterus.   Neck: Normal range of motion.   Cardiovascular: Normal rate and regular rhythm. Exam reveals no gallop and no friction rub.   No murmur heard.  No LE edema   Pulmonary/Chest: Effort normal and breath sounds normal. He has no wheezes. He has no rales.   Abdominal: Soft. Bowel sounds are normal. There is no tenderness. There is no rebound and no guarding.   Musculoskeletal: Normal range of motion. He exhibits no edema or tenderness.   Neurological: He is alert and oriented to person, place, and time. No cranial nerve deficit.   Skin: Skin is warm and dry.   Psychiatric: He has a normal mood and affect.       Assessment and Plan   1. Routine screening for STI (sexually transmitted infection)  Screening STI's below if negative will start once daily Truvada  - Hepatitis C antibody; Future  - Hepatitis B surface antibody; Future  - Hepatitis B surface antigen; Future  - Hepatitis B core antibody, total; Future  - RPR; Future  - C. trachomatis/N. gonorrhoeae by AMP DNA [facility]; Urine    2. At risk for HIV due to homosexual contact  Check baseline LFT's reviewed need for regular follow up to continue medication  - Comprehensive metabolic panel; Future    4. Erectile dysfunction, unspecified erectile dysfunction type  Prn cialis, information for compounding pharmacy  - tadalafil (CIALIS) 20 MG Tab; Take 1 tablet (20 mg total) by mouth daily as needed for Erectile Dysfunction.  Dispense: 30 tablet; Refill: 3    5. Hypertension, essential  U/a for proteniuria start amlodipine BP check in one month  - amLODIPine (NORVASC) 5 MG tablet; Take 1 tablet (5 mg total) by mouth once daily.  Dispense: 30 tablet; Refill: 11  - " "INFLUENZA VACCINE (1) 09/01/2021     FALL RISK ASSESSMENT  02/04/2022     Lab work is in process  Labs reviewed in EPIC  BP Readings from Last 3 Encounters:   04/06/22 139/77   02/04/21 138/79   01/27/21 108/67    Wt Readings from Last 3 Encounters:   04/06/22 65.6 kg (144 lb 9.6 oz)   02/04/21 69.1 kg (152 lb 4 oz)   01/27/21 68.9 kg (152 lb)                  Patient Active Problem List   Diagnosis   (none) - all problems resolved or deleted     History reviewed. No pertinent surgical history.    Social History     Tobacco Use     Smoking status: Never Smoker     Smokeless tobacco: Never Used   Substance Use Topics     Alcohol use: No     History reviewed. No pertinent family history.      Current Outpatient Medications   Medication Sig Dispense Refill     acetaminophen (TYLENOL) 650 MG CR tablet Take 1 tablet (650 mg) by mouth every 8 hours as needed for mild pain 90 tablet 11     No Known Allergies        Review of Systems  Constitutional, HEENT, cardiovascular, pulmonary, GI, , musculoskeletal, neuro, skin, endocrine and psych systems are negative, except as otherwise noted.    OBJECTIVE:   /77 (BP Location: Left arm, Patient Position: Sitting, Cuff Size: Adult Regular)   Pulse 76   Temp 98.7  F (37.1  C) (Tympanic)   Resp 18   Ht 1.568 m (5' 1.75\")   Wt 65.6 kg (144 lb 9.6 oz)   LMP  (LMP Unknown)   SpO2 99%   BMI 26.66 kg/m   Estimated body mass index is 29.24 kg/m  as calculated from the following:    Height as of 2/4/21: 1.537 m (5' 0.5\").    Weight as of 2/4/21: 69.1 kg (152 lb 4 oz).  Physical Exam  GENERAL: healthy, alert and no distress  NECK: no adenopathy, no asymmetry, masses, or scars and thyroid normal to palpation  RESP: lungs clear to auscultation - no rales, rhonchi or wheezes  CV: regular rate and rhythm, normal S1 S2, no S3 or S4, no murmur, click or rub, no peripheral edema and peripheral pulses strong  ABDOMEN: soft, nontender, no hepatosplenomegaly, no masses and bowel sounds " "normal  MS: no gross musculoskeletal defects noted, no edema    Diagnostic Test Results:  Labs reviewed in Epic    ASSESSMENT / PLAN:   (Z00.00) Encounter for Medicare annual wellness exam  (primary encounter diagnosis)  Comment:   Plan: as below.    (I10) Essential hypertension with goal blood pressure less than 140/90  Comment:   Plan: Comprehensive metabolic panel (BMP + Alb, Alk         Phos, ALT, AST, Total. Bili, TP), Albumin         Random Urine Quantitative with Creat Ratio        Patient stopped amlodipine. bp's are okay. Monitor.    (E87.6) Hypokalemia  Comment:   Plan: Comprehensive metabolic panel (BMP + Alb, Alk         Phos, ALT, AST, Total. Bili, TP)        Recheck. Patient off potassium. Patient trying to increase potassium intake in diet.    (Z13.6) CARDIOVASCULAR SCREENING; LDL GOAL LESS THAN 130  Comment:   Plan: Comprehensive metabolic panel (BMP + Alb, Alk         Phos, ALT, AST, Total. Bili, TP), Lipid panel         reflex to direct LDL Non-fasting            (Z13.1) Screening for diabetes mellitus  Comment:   Plan: check glucose.    (Z78.0) Postmenopausal estrogen deficiency  Comment:   Plan: DEXA HIP/PELVIS/SPINE - Future              Patient has been advised of split billing requirements and indicates understanding: Yes    COUNSELING:  Reviewed preventive health counseling, as reflected in patient instructions       Regular exercise       Healthy diet/nutrition       Vision screening    Estimated body mass index is 29.24 kg/m  as calculated from the following:    Height as of 2/4/21: 1.537 m (5' 0.5\").    Weight as of 2/4/21: 69.1 kg (152 lb 4 oz).        She reports that she has never smoked. She has never used smokeless tobacco.      Appropriate preventive services were discussed with this patient, including applicable screening as appropriate for cardiovascular disease, diabetes, osteopenia/osteoporosis, and glaucoma.  As appropriate for age/gender, discussed screening for colorectal " Urinalysis    6. Recurrent major depressive disorder, in full remission  Improved with higher dose lexapro continue  - escitalopram oxalate (LEXAPRO) 20 MG tablet; Take 1 tablet (20 mg total) by mouth once daily.  Dispense: 90 tablet; Refill: 3   cancer, prostate cancer, breast cancer, and cervical cancer. Checklist reviewing preventive services available has been given to the patient.    Reviewed patients plan of care and provided an AVS. The Basic Care Plan (routine screening as documented in Health Maintenance) for Alesha meets the Care Plan requirement. This Care Plan has been established and reviewed with the Patient.    Counseling Resources:  ATP IV Guidelines  Pooled Cohorts Equation Calculator  Breast Cancer Risk Calculator  Breast Cancer: Medication to Reduce Risk  FRAX Risk Assessment  ICSI Preventive Guidelines  Dietary Guidelines for Americans, 2010  USDA's MyPlate  ASA Prophylaxis  Lung CA Screening    Romie Solorio MD, MD  [facility]    Identified Health Risks:

## 2022-04-07 LAB
CREAT UR-MCNC: 68 MG/DL
MICROALBUMIN UR-MCNC: 26 MG/L
MICROALBUMIN/CREAT UR: 38.24 MG/G CR (ref 0–25)

## 2022-10-06 ENCOUNTER — OFFICE VISIT (OUTPATIENT)
Dept: FAMILY MEDICINE | Facility: CLINIC | Age: 77
End: 2022-10-06
Payer: MEDICAID

## 2022-10-06 VITALS
HEART RATE: 70 BPM | WEIGHT: 141.2 LBS | SYSTOLIC BLOOD PRESSURE: 150 MMHG | RESPIRATION RATE: 15 BRPM | BODY MASS INDEX: 25.98 KG/M2 | HEIGHT: 62 IN | DIASTOLIC BLOOD PRESSURE: 83 MMHG | TEMPERATURE: 97.7 F | OXYGEN SATURATION: 100 %

## 2022-10-06 DIAGNOSIS — M54.6 ACUTE MIDLINE THORACIC BACK PAIN: Primary | ICD-10-CM

## 2022-10-06 PROCEDURE — 99214 OFFICE O/P EST MOD 30 MIN: CPT | Mod: 25 | Performed by: INTERNAL MEDICINE

## 2022-10-06 PROCEDURE — 90677 PCV20 VACCINE IM: CPT | Performed by: INTERNAL MEDICINE

## 2022-10-06 PROCEDURE — 90471 IMMUNIZATION ADMIN: CPT | Performed by: INTERNAL MEDICINE

## 2022-10-06 RX ORDER — MELOXICAM 15 MG/1
15 TABLET ORAL DAILY
Qty: 60 TABLET | Refills: 0 | Status: SHIPPED | OUTPATIENT
Start: 2022-10-06 | End: 2023-02-23

## 2022-10-06 ASSESSMENT — PAIN SCALES - GENERAL: PAINLEVEL: EXTREME PAIN (8)

## 2022-10-06 NOTE — PROGRESS NOTES
"  Assessment & Plan     Acute midline thoracic back pain  Complaining of midline back pain  Also pain in multiple other joints  Examination shows that she is tender in the lower thoracic pain  Straight leg raising test negative  She has no other joint swellings or joint effusions  I think her joint pains are from osteoarthritis  - XR Thoracic Lumbar Spine 2 Views; Future  - meloxicam (MOBIC) 15 MG tablet; Take 1 tablet (15 mg) by mouth daily  - diclofenac (VOLTAREN) 1 % topical gel; Apply 2 g topically 2 times daily      30 minutes spent on the date of the encounter doing chart review, history and exam, documentation and further activities per the note       BMI:   Estimated body mass index is 26.04 kg/m  as calculated from the following:    Height as of this encounter: 1.568 m (5' 1.75\").    Weight as of this encounter: 64 kg (141 lb 3.2 oz).           Return in about 4 weeks (around 11/3/2022).    Hernan Nuñez MD  [facility]    Surjit Eduardo is a 77 year old, presenting for the following health issues:  Generalized Body Aches (Arm, knee, back, stomach )      History of Present Illness       Reason for visit:  Follow up as scheduled    She eats 2-3 servings of fruits and vegetables daily.She consumes 0 sweetened beverage(s) daily.She exercises with enough effort to increase her heart rate 10 to 19 minutes per day.  She is missing 1 dose(s) of medications per week.  She is not taking prescribed medications regularly due to other.       Pain History:  When did you first notice your pain? - Acute Pain   Have you seen anyone else for your pain? No  Where in your body do you have pain? Back Pain  Onset/Duration: one or months ago  Description:   Location of pain: middle of back both and upper back both  Character of pain: sharp, stabbing, burning and waxing and waning  Pain radiation: radiates into the left leg  New numbness or weakness in legs, not attributed to pain: no   Intensity: " "Currently 8/10  Progression of Symptoms: same  History:   Specific cause: none  Pain interferes with job: No  History of back problems: no prior back problems  Any previous MRI or X-rays: None  Sees a specialist for back pain: Not sure   Alleviating factors:   Improved by: Hot rub     Precipitating factors:  Worsened by: Bending, Standing and Sitting  Therapies tried and outcome: acetaminophen (Tylenol)    Accompanying Signs & Symptoms:  Risk of Fracture: None  Risk of Cauda Equina: None  Risk of Infection: None  Risk of Cancer: None  Risk of Ankylosing Spondylitis: Onset at age <35, male, AND morning back stiffness No              Review of Systems   Constitutional, HEENT, cardiovascular, pulmonary, gi and gu systems are negative, except as otherwise noted.      Objective    BP (!) 150/83 (BP Location: Left arm, Patient Position: Sitting, Cuff Size: Adult Regular)   Pulse 70   Temp 97.7  F (36.5  C) (Oral)   Resp 15   Ht 1.568 m (5' 1.75\")   Wt 64 kg (141 lb 3.2 oz)   LMP  (LMP Unknown)   SpO2 100%   BMI 26.04 kg/m    Body mass index is 26.04 kg/m .  Physical Exam   GENERAL: healthy, alert and no distress  NECK: no adenopathy, no asymmetry, masses, or scars and thyroid normal to palpation  RESP: lungs clear to auscultation - no rales, rhonchi or wheezes  CV: regular rate and rhythm, normal S1 S2, no S3 or S4, no murmur, click or rub, no peripheral edema and peripheral pulses strong  ABDOMEN: soft, nontender, no hepatosplenomegaly, no masses and bowel sounds normal  MS: Tender on palpation of the lower back  Crepitus heard on examination of the knees  No swelling appreciated in any of the joints                    "

## 2022-10-06 NOTE — PATIENT INSTRUCTIONS
At [facility], we strive to deliver an exceptional experience to you, every time we see you. If you receive a survey, please complete it as we do value your feedback.  If you have MyChart, you can expect to receive results automatically within 24 hours of their completion.  Your provider will send a note interpreting your results as well.   If you do not have MyChart, you should receive your results in about a week by mail.    Your care team:                            Family Medicine Internal Medicine   MD Carrillo Martinez MD Shantel Branch-Fleming, MD Srinivasa Vaka, MD Katya Belousova, TRACI Cornell CNP, MD ([facility]   Magen Lopez, MD Ely Haas MD Amelia Massimini APRN CNP Kim Thein, APRN CNP Bethany Templen, MD             Clinic hours: Monday - Thursday 7 am-6 pm; Fridays 7 am-5 pm.   Urgent care: Monday - Friday 10 am- 8 pm; Saturday and Sunday 9 am-5 pm.    Clinic: [phone]       [facility]: Monday - Thursday 8 am - 7 pm; Friday 8 am - 6 pm  [facility]: [phone]

## 2022-10-06 NOTE — NURSING NOTE
Prior to immunization administration, verified patients identity using patient s name and date of birth. Please see Immunization Activity for additional information.     Screening Questionnaire for Adult Immunization    Are you sick today?   No   Do you have allergies to medications, food, a vaccine component or latex?   No   Have you ever had a serious reaction after receiving a vaccination?   No   Do you have a long-term health problem with heart, lung, kidney, or metabolic disease (e.g., diabetes), asthma, a blood disorder, no spleen, complement component deficiency, a cochlear implant, or a spinal fluid leak?  Are you on long-term aspirin therapy?   No   Do you have cancer, leukemia, HIV/AIDS, or any other immune system problem?   No   Do you have a parent, brother, or sister with an immune system problem?   No   In the past 3 months, have you taken medications that affect  your immune system, such as prednisone, other steroids, or anticancer drugs; drugs for the treatment of rheumatoid arthritis, Crohn s disease, or psoriasis; or have you had radiation treatments?   No   Have you had a seizure, or a brain or other nervous system problem?   No   During the past year, have you received a transfusion of blood or blood    products, or been given immune (gamma) globulin or antiviral drug?   No   For women: Are you pregnant or is there a chance you could become       pregnant during the next month?   No   Have you received any vaccinations in the past 4 weeks?   No     Immunization questionnaire answers were all negative.        Patient instructed to remain in clinic for 15 minutes afterwards, and to report any adverse reaction to me immediately.       Screening performed by Betzaida Castanon MA on 10/6/2022 at 10:13 AM.

## 2022-12-01 ENCOUNTER — TELEPHONE (OUTPATIENT)
Dept: FAMILY MEDICINE | Facility: CLINIC | Age: 77
End: 2022-12-01
Payer: MEDICAID

## 2022-12-01 NOTE — LETTER
[address] [address]  [phone]  Dept: [phone]    December 1, 2022    Alesha Alatorre  [address]  [place] [address]    Dear Alesha Alatorre,     At [facility] we care about your health and are committed to providing quality patient care.    Which includes staying current on preventive cancer screenings.  You can increase your chances of finding and treating cancers through regular screenings.      Our records indicate you may be due for the following preventive screening(s):  Blood Pressure Check    To schedule an appointment or discuss this screening further, you may contact us by phone at the [facility] at [phone] or online through the patient portal/[email or web address] @ [email or web address]    If you have had any of the screenings listed above at another facility, please call us so that we may update your chart.      Your partners in health,      Quality Committee at [facility]      Please make a nurse only visit to check your blood pressure. You can call the  to scheduled your appointment and it is a no charge towards you.    Thanks!

## 2022-12-01 NOTE — TELEPHONE ENCOUNTER
Patient Quality Outreach    Patient is due for the following:   Hypertension -  BP check    Next Steps:   Schedule a nurse only visit for Bp check    Type of outreach:    Sent letter.      Questions for provider review:    None     Clint Grider MA

## 2023-02-23 ENCOUNTER — OFFICE VISIT (OUTPATIENT)
Dept: FAMILY MEDICINE | Facility: CLINIC | Age: 78
End: 2023-02-23
Payer: MEDICAID

## 2023-02-23 VITALS
DIASTOLIC BLOOD PRESSURE: 71 MMHG | HEART RATE: 80 BPM | WEIGHT: 138 LBS | RESPIRATION RATE: 18 BRPM | SYSTOLIC BLOOD PRESSURE: 136 MMHG | BODY MASS INDEX: 25.4 KG/M2 | OXYGEN SATURATION: 99 % | TEMPERATURE: 97.3 F | HEIGHT: 62 IN

## 2023-02-23 DIAGNOSIS — S29.012A STRAIN OF LATISSIMUS DORSI MUSCLE, INITIAL ENCOUNTER: ICD-10-CM

## 2023-02-23 DIAGNOSIS — S46.812A TRAPEZIUS STRAIN, LEFT, INITIAL ENCOUNTER: ICD-10-CM

## 2023-02-23 DIAGNOSIS — M41.85 OTHER FORM OF SCOLIOSIS OF THORACOLUMBAR SPINE: Primary | ICD-10-CM

## 2023-02-23 PROCEDURE — 99213 OFFICE O/P EST LOW 20 MIN: CPT | Performed by: INTERNAL MEDICINE

## 2023-02-23 RX ORDER — METHOCARBAMOL 500 MG/1
500 TABLET, FILM COATED ORAL 4 TIMES DAILY PRN
Qty: 120 TABLET | Refills: 5 | Status: SHIPPED | OUTPATIENT
Start: 2023-02-23

## 2023-02-23 ASSESSMENT — PAIN SCALES - GENERAL: PAINLEVEL: EXTREME PAIN (9)

## 2023-02-23 NOTE — PATIENT INSTRUCTIONS
At [facility], we strive to deliver an exceptional experience to you, every time we see you. If you receive a survey, please complete it as we do value your feedback.  If you have MyChart, you can expect to receive results automatically within 24 hours of their completion.  Your provider will send a note interpreting your results as well.   If you do not have MyChart, you should receive your results in about a week by mail.    Your care team:                            Family Medicine Internal Medicine   MD Carrillo Martinez MD Shantel Branch-Fleming, MD Srinivasa Vaka, MD Katya Belousova, PATRACI Bourgeois CNP, MD ([facility]   Magen Lopez, MD Ely Haas MD Amelia Massimini APRN MIRIAM Mckenzie APRN MD Philip Lino MD          Clinic hours: Monday - Thursday 7 am-6 pm; Fridays 7 am-5 pm.   Urgent care: Monday - Friday 10 am- 8 pm; Saturday and Sunday 9 am-5 pm.    Clinic: [phone]       [facility]: Monday - Thursday 8 am - 7 pm; Friday 8 am - 6 pm  [facility]: [phone]

## 2023-02-23 NOTE — PROGRESS NOTES
[facility] Internal Medicine Progress Note           Assessment and Plan:     Other form of scoliosis of thoracolumbar spine  - methocarbamol (ROBAXIN) 500 MG tablet; Take 1 tablet (500 mg) by mouth 4 times daily as needed for muscle spasms  - Home Care Referral  - diclofenac (VOLTAREN) 1 % topical gel; Apply 2 g topically 2 times daily    Trapezius strain, left, initial encounter  - methocarbamol (ROBAXIN) 500 MG tablet; Take 1 tablet (500 mg) by mouth 4 times daily as needed for muscle spasms  - Home Care Referral  - diclofenac (VOLTAREN) 1 % topical gel; Apply 2 g topically 2 times daily    Strain of left latissimus dorsi muscle, initial encounter  - methocarbamol (ROBAXIN) 500 MG tablet; Take 1 tablet (500 mg) by mouth 4 times daily as needed for muscle spasms  - Home Care Referral  - diclofenac (VOLTAREN) 1 % topical gel; Apply 2 g topically 2 times daily           Interval History:   History of Present Illness       Reason for visit:  Pains in the back arm and head  Symptom onset:  1-2 weeks ago  Symptom intensity:  Severe  Symptom progression:  Worsening  Had these symptoms before:  Yes  Has tried/received treatment for these symptoms:  Yes  Previous treatment was successful:  Yes              Significant Problems:   Patient Active Problem List   Diagnosis     Other form of scoliosis of thoracolumbar spine     Trapezius strain, left, initial encounter     Strain of left latissimus dorsi muscle, initial encounter              Review of Systems:   CONSTITUTIONAL: NEGATIVE for fever, chills, change in weight  INTEGUMENTARY/SKIN: NEGATIVE for worrisome rashes, moles or lesions  EYES: NEGATIVE for vision changes or irritation  ENT/MOUTH: NEGATIVE for ear, mouth and throat problems  RESP: NEGATIVE for significant cough or SOB  BREAST: NEGATIVE for masses, tenderness or discharge  CV: NEGATIVE for chest pain, palpitations or peripheral edema  GI: NEGATIVE for nausea, abdominal pain, heartburn, or change  in bowel habits  : NEGATIVE for frequency, dysuria, or hematuria  MUSCULOSKELETAL:As above.  NEURO: NEGATIVE for weakness, dizziness or paresthesias  ENDOCRINE: NEGATIVE for temperature intolerance, skin/hair changes  HEME: NEGATIVE for bleeding problems  PSYCHIATRIC: NEGATIVE for changes in mood or affect            Physical Exam:   -  Constitutional: Awake, alert, cooperative, no apparent distress, and appears stated age.  Eyes: extra-ocular muscles intact  ENT: normocepalic, without obvious abnormality  Lungs: no increased work of breathing and no retractions  Musculoskeletal: no lower extremity pitting edema present  Neurologic: Mental Status Exam:  Level of Alertness:   awake  Orientation:   person, place, time  Memory:   normal  Fund of Knowledge:  normal  Attention/Concentration:  normal  Language:  normal  Neuropsychiatric: Normal affect, mood, orientation, memory and insight.  Skin: No rashes, erythema, pallor, petechia or purpura.     Disposition:  Follow-up in one  Week.    Follow-up in e weeks.   Disposition:      Carrillo Hampton MD  Internal Medicine  [facility] Team

## 2025-01-07 ENCOUNTER — TELEPHONE (OUTPATIENT)
Dept: FAMILY MEDICINE | Facility: CLINIC | Age: 80
End: 2025-01-07
Payer: MEDICAID

## 2025-01-07 NOTE — LETTER
January 7, 2025    Alesha Alatorre  [address]  [facility] [address]    Dear Alesha,    At [facility] we care about your health and are committed to providing quality patient care.     Here is a list of Health Maintenance topics that are due now or due soon:  Health Maintenance Due   Topic Date Due    DEXA  Never done    ZOSTER IMMUNIZATION (1 of 2) Never done    RSV VACCINE (1 - 1-dose 75+ series) Never done    MEDICARE ANNUAL WELLNESS VISIT  04/06/2023    BMP  04/06/2023    ANNUAL REVIEW OF HM ORDERS  04/06/2023    FALL RISK ASSESSMENT  04/06/2023    INFLUENZA VACCINE (1) 09/01/2024    COVID-19 Vaccine (4 - 2024-25 season) 09/01/2024    PHQ-2 (once per calendar year)  01/01/2025        We are recommending that you:  Schedule a WELLNESS (Preventative/Physical) APPOINTMENT with your primary care provider. If you go elsewhere for your wellness appointments then please disregard this reminder     and   Schedule a Nurse-Only appointment to update your immunizations: Your records indicate that you are not up to date with your immunizations, please schedule a nurse-only appointment to get these updated or update them at your next office visit. If this is incorrect, please disregard.    To schedule an appointment or discuss this further, you may contact us by phone at the [facility] at [phone] or online through the patient [email or web address] @ [email or web address]    Thank you for trusting [facility] and we appreciate the opportunity to serve you.  We look forward to supporting your healthcare needs in the future.    Your partners in health,      Quality Committee at [facility]

## 2025-01-07 NOTE — TELEPHONE ENCOUNTER
Patient Quality Outreach    Patient is due for the following:   Physical Annual Wellness Visit      Topic Date Due    Zoster (Shingles) Vaccine (1 of 2) Never done    Flu Vaccine (1) 09/01/2024    COVID-19 Vaccine (4 - 2024-25 season) 09/01/2024       Action(s) Taken:   Schedule a Annual Wellness Visit    Type of outreach:    Sent letter.    Questions for provider review:    None           Verito Kim MA